# Patient Record
Sex: FEMALE | Race: WHITE | Employment: FULL TIME | URBAN - METROPOLITAN AREA
[De-identification: names, ages, dates, MRNs, and addresses within clinical notes are randomized per-mention and may not be internally consistent; named-entity substitution may affect disease eponyms.]

---

## 2018-08-09 ENCOUNTER — HOSPITAL ENCOUNTER (OUTPATIENT)
Age: 33
Discharge: HOME | End: 2018-08-11
Payer: COMMERCIAL

## 2018-08-09 PROCEDURE — 87088 URINE BACTERIA CULTURE: CPT

## 2018-08-12 LAB — URINE CULTURE, ROUTINE: NORMAL

## 2019-02-04 ENCOUNTER — HOSPITAL ENCOUNTER (OUTPATIENT)
Age: 34
Discharge: HOME | End: 2019-02-06

## 2019-02-04 PROCEDURE — 88305 TISSUE EXAM BY PATHOLOGIST: CPT

## 2022-05-19 ENCOUNTER — HOSPITAL ENCOUNTER (OUTPATIENT)
Age: 37
Discharge: HOME | End: 2022-05-19
Payer: COMMERCIAL

## 2022-05-19 PROCEDURE — 87389 HIV-1 AG W/HIV-1&-2 AB AG IA: CPT

## 2022-05-19 PROCEDURE — 36415 COLL VENOUS BLD VENIPUNCTURE: CPT

## 2022-05-19 PROCEDURE — 87340 HEPATITIS B SURFACE AG IA: CPT

## 2022-05-19 PROCEDURE — 86803 HEPATITIS C AB TEST: CPT

## 2022-05-19 PROCEDURE — 86703 HIV-1/HIV-2 1 RESULT ANTBDY: CPT

## 2022-05-20 LAB
HEPATITIS B SURFACE ANTIGEN INTERPRETATION: NORMAL
HEPATITIS C ANTIBODY INTERPRETATION: NORMAL
HIV-1 AND HIV-2 ANTIBODIES: NORMAL

## 2022-05-24 LAB
Lab: NORMAL
REPORT: NORMAL
THIS TEST SENT TO: NORMAL

## 2023-04-18 ENCOUNTER — HOSPITAL ENCOUNTER (OUTPATIENT)
Age: 38
Discharge: HOME | End: 2023-04-20

## 2023-04-18 PROCEDURE — 88305 TISSUE EXAM BY PATHOLOGIST: CPT

## 2023-04-28 DIAGNOSIS — R30.0 DYSURIA: ICD-10-CM

## 2023-04-28 PROCEDURE — 81003 URINALYSIS AUTO W/O SCOPE: CPT | Performed by: ADVANCED PRACTICE MIDWIFE

## 2024-01-22 ENCOUNTER — TELEPHONE (OUTPATIENT)
Dept: AUDIOLOGY | Age: 39
End: 2024-01-22

## 2024-01-22 ENCOUNTER — OFFICE VISIT (OUTPATIENT)
Dept: ENT CLINIC | Age: 39
End: 2024-01-22
Payer: COMMERCIAL

## 2024-01-22 ENCOUNTER — PROCEDURE VISIT (OUTPATIENT)
Dept: AUDIOLOGY | Age: 39
End: 2024-01-22
Payer: COMMERCIAL

## 2024-01-22 VITALS — HEART RATE: 80 BPM | SYSTOLIC BLOOD PRESSURE: 126 MMHG | DIASTOLIC BLOOD PRESSURE: 83 MMHG

## 2024-01-22 DIAGNOSIS — H92.03 OTALGIA OF BOTH EARS: Primary | ICD-10-CM

## 2024-01-22 DIAGNOSIS — R42 DIZZINESS: ICD-10-CM

## 2024-01-22 DIAGNOSIS — J32.4 CHRONIC PANSINUSITIS: ICD-10-CM

## 2024-01-22 DIAGNOSIS — R42 DISEQUILIBRIUM: Primary | ICD-10-CM

## 2024-01-22 PROCEDURE — G8419 CALC BMI OUT NRM PARAM NOF/U: HCPCS | Performed by: OTOLARYNGOLOGY

## 2024-01-22 PROCEDURE — G8427 DOCREV CUR MEDS BY ELIG CLIN: HCPCS | Performed by: OTOLARYNGOLOGY

## 2024-01-22 PROCEDURE — 92567 TYMPANOMETRY: CPT | Performed by: AUDIOLOGIST

## 2024-01-22 PROCEDURE — 99204 OFFICE O/P NEW MOD 45 MIN: CPT | Performed by: OTOLARYNGOLOGY

## 2024-01-22 PROCEDURE — G8484 FLU IMMUNIZE NO ADMIN: HCPCS | Performed by: OTOLARYNGOLOGY

## 2024-01-22 PROCEDURE — 4004F PT TOBACCO SCREEN RCVD TLK: CPT | Performed by: OTOLARYNGOLOGY

## 2024-01-22 RX ORDER — PANTOPRAZOLE SODIUM 40 MG/1
TABLET, DELAYED RELEASE ORAL
COMMUNITY
Start: 2023-12-20

## 2024-01-22 ASSESSMENT — ENCOUNTER SYMPTOMS
TROUBLE SWALLOWING: 0
VOICE CHANGE: 0
SINUS PRESSURE: 1
COUGH: 0
SINUS PAIN: 1
VOMITING: 0
SHORTNESS OF BREATH: 0

## 2024-01-22 NOTE — PROGRESS NOTES
adenopathy.   Skin:     General: Skin is warm.      Findings: No erythema.   Neurological:      Mental Status: She is alert.      Cranial Nerves: No cranial nerve deficit.       IMPRESSION/PLAN:  1. Disequilibrium  -     Videonystagmography; Future  2. Chronic pansinusitis      Vng  Bactroban nasal saline  Follow up in 6 weeks for results poss MRI   Dr. Johann Bautista D.O. Ms. Ed.  Otolaryngology Facial Plastic Surgery  :[facility]  Associate Clinical Professor:  ARELIS LOZANO NEOMED  [facility]

## 2024-01-22 NOTE — PROGRESS NOTES
This patient was referred for tympanometric testing by Dr. Bautista due to ear pain and dizziness. Audio done previously at [facility].     Tympanometry revealed normal middle ear peak pressure and compliance, bilaterally.    The results were reviewed with the patient.     Recommendations for follow up will be made pending physician consult.      Aminah Rodrigues CCC-A  [facility] [id number]   Electronically signed by Aminah Rodrigues on 1/22/2024 at 10:56 AM

## 2024-01-22 NOTE — PATIENT INSTRUCTIONS
Please get over the counter [employer] nasal saline mist and use 2 sprays each nostril 2-3 times daily-- you will wants to use this 15-20 minutes prior to any medicated nasal sprays.    BACTROBAN NASAL SPRAY     Fill prescription for 22 grams of Bactroban ointment.     Purchase a bottle of nasal saline ( Nasal, Ocean or generic)     In a coffee mug mix:  1/3 tube ointment in 44 or 45 ml bottle of saline  OR  2/3 tube ointment in 88 ml bottle of saline  Microwave 15 seconds  Stir and let solution cool  After the solution is cool, poor the liquid back into the empty saline bottle and shake well     Use 2 sprays in each nostril 2 times a day for 2 weeks.      When using the Prescription Nasal Spray use your right hand to spray into the left nostril and the left hand to spray into the right nostril. Do Not Sniff after spraying. This must be used daily to get improvement of symptoms which takes at least 2-3 weeks to see any results. May take up to six weeks to get the best improvement.

## 2024-02-16 ENCOUNTER — HOSPITAL ENCOUNTER (OUTPATIENT)
Age: 39
Discharge: HOME | End: 2024-02-18

## 2024-02-16 PROCEDURE — 87077 CULTURE AEROBIC IDENTIFY: CPT

## 2024-02-18 LAB
HELIOBACTER PYLORI ID: NEGATIVE
SOURCE, 60200063: NORMAL

## 2024-02-29 ENCOUNTER — TELEPHONE (OUTPATIENT)
Dept: ENT CLINIC | Age: 39
End: 2024-02-29

## 2024-02-29 ENCOUNTER — TELEPHONE (OUTPATIENT)
Dept: AUDIOLOGY | Age: 39
End: 2024-02-29

## 2024-02-29 NOTE — TELEPHONE ENCOUNTER
Patient was not feeling well and had to cancel VNG testing for 3/1. Patient rescheduled for 3/6 at 1 PM. Please advise appointment for follow up. Thank you!    Electronically signed by Aminah Osborn on 2/29/2024 at 12:37 PM

## 2024-02-29 NOTE — TELEPHONE ENCOUNTER
Pt called and cancelled her appt on 03/04 as she rescheduled her VNG to 03/06. No soon appointments due to pt care. Please contact pt to reschedule.

## 2024-03-06 ENCOUNTER — HOSPITAL ENCOUNTER (OUTPATIENT)
Dept: AUDIOLOGY | Age: 39
Discharge: HOME | End: 2024-03-06

## 2024-03-06 NOTE — PROGRESS NOTES
VNG EVALUATION    REASON FOR REFERRAL:  This patient was referred for VNG testing by Johann Bautista DO due to dizziness.  Patient reported around a year ago she had COVID and after started to experience loud tinnitus, bilaterally and vertigo. Patient reported that the episodes come and go randomly, and happen more often when she is laying down or laying down on her right side.     RESULTS:  Bithermal caloric irrigations revealed appropriate beating nystagmus with no unilateral weakness.  Directional preponderance and fixation suppression were within normal limits.      No irregular eye movements were recorded during saccades, pendular tracking, or optokinetic testing.      No significant nystagmus was recorded during gaze or positional testing.        IMPRESSION:  VNG test results indicates vestibular function within normal limits bilaterally.      Oculomotor and positional test results were within normal limits.       If I can be of further assistance or provide additional information, please do not hesitate to contact this office.    Thank you for the referral.      __________________________________  Aminah Osborn/CCC-THANH  [facility] [id number]  Electronically signed by Aminah Osborn on 3/6/2024 at 1:18 PM

## 2024-03-18 ENCOUNTER — OFFICE VISIT (OUTPATIENT)
Dept: ENT CLINIC | Age: 39
End: 2024-03-18
Payer: COMMERCIAL

## 2024-03-18 VITALS
HEART RATE: 59 BPM | BODY MASS INDEX: 23.89 KG/M2 | SYSTOLIC BLOOD PRESSURE: 130 MMHG | DIASTOLIC BLOOD PRESSURE: 85 MMHG | WEIGHT: 148 LBS

## 2024-03-18 DIAGNOSIS — R42 DISEQUILIBRIUM: ICD-10-CM

## 2024-03-18 DIAGNOSIS — J32.4 CHRONIC PANSINUSITIS: Primary | ICD-10-CM

## 2024-03-18 PROCEDURE — G8420 CALC BMI NORM PARAMETERS: HCPCS | Performed by: OTOLARYNGOLOGY

## 2024-03-18 PROCEDURE — 99213 OFFICE O/P EST LOW 20 MIN: CPT | Performed by: OTOLARYNGOLOGY

## 2024-03-18 PROCEDURE — 4004F PT TOBACCO SCREEN RCVD TLK: CPT | Performed by: OTOLARYNGOLOGY

## 2024-03-18 PROCEDURE — G8428 CUR MEDS NOT DOCUMENT: HCPCS | Performed by: OTOLARYNGOLOGY

## 2024-03-18 PROCEDURE — G8484 FLU IMMUNIZE NO ADMIN: HCPCS | Performed by: OTOLARYNGOLOGY

## 2024-03-18 RX ORDER — FLUTICASONE PROPIONATE 50 MCG
2 SPRAY, SUSPENSION (ML) NASAL DAILY
Qty: 1 EACH | Refills: 3 | Status: SHIPPED | OUTPATIENT
Start: 2024-03-18

## 2024-03-18 ASSESSMENT — ENCOUNTER SYMPTOMS
TROUBLE SWALLOWING: 0
RHINORRHEA: 0
SORE THROAT: 0
VOICE CHANGE: 0
VOMITING: 0
SHORTNESS OF BREATH: 0

## 2024-03-18 NOTE — PROGRESS NOTES
Gastrointestinal:  Negative for vomiting.   Skin:  Negative for rash.   Allergic/Immunologic: Negative for environmental allergies.   Neurological:  Positive for dizziness. Negative for headaches.   Hematological:  Does not bruise/bleed easily.   All other systems reviewed and are negative.      /85 (Site: Left Upper Arm, Position: Sitting, Cuff Size: Medium Adult)   Pulse 59   Wt 67.1 kg (148 lb)   BMI 23.89 kg/m²   Physical Exam  Vitals and nursing note reviewed.   Constitutional:       Appearance: She is well-developed.   HENT:      Head: Normocephalic and atraumatic.      Right Ear: Tympanic membrane and ear canal normal. There is no impacted cerumen.      Left Ear: Tympanic membrane and ear canal normal. There is no impacted cerumen.      Nose: No congestion or rhinorrhea.      Mouth/Throat:      Pharynx: No oropharyngeal exudate or posterior oropharyngeal erythema.   Eyes:      Pupils: Pupils are equal, round, and reactive to light.   Neck:      Thyroid: No thyromegaly.      Trachea: No tracheal deviation.   Cardiovascular:      Rate and Rhythm: Normal rate.   Pulmonary:      Effort: Pulmonary effort is normal. No respiratory distress.   Musculoskeletal:         General: Normal range of motion.      Cervical back: Normal range of motion.   Lymphadenopathy:      Cervical: No cervical adenopathy.   Skin:     General: Skin is warm.      Findings: No erythema.   Neurological:      Mental Status: She is alert.      Cranial Nerves: No cranial nerve deficit.         IMPRESSION/PLAN:  1. Chronic pansinusitis  2. Disequilibrium    Continue saline  Start flonase  Follow up prn       Alexandra RUIZ MA, am scribing for and in the presence of Johann Bautista DO. 3/18/24/3:41 PM EDT      Dr. Johann Bautista D.O. Ms. Ed.  Otolaryngology Facial Plastic Surgery  :  [facility]  Associate Clinical Professor:  ARELIS LOZANO NEOMED  [facility]            Evangelina VEGA